# Patient Record
Sex: FEMALE | Race: WHITE
[De-identification: names, ages, dates, MRNs, and addresses within clinical notes are randomized per-mention and may not be internally consistent; named-entity substitution may affect disease eponyms.]

---

## 2018-06-26 ENCOUNTER — HOSPITAL ENCOUNTER (EMERGENCY)
Dept: HOSPITAL 62 - ER | Age: 18
LOS: 1 days | Discharge: HOME | End: 2018-06-27
Payer: OTHER GOVERNMENT

## 2018-06-26 DIAGNOSIS — R10.10: Primary | ICD-10-CM

## 2018-06-26 DIAGNOSIS — E86.0: ICD-10-CM

## 2018-06-26 LAB
ADD MANUAL DIFF: NO
ALBUMIN SERPL-MCNC: 4.9 G/DL (ref 3.7–5.6)
ALP SERPL-CCNC: 66 U/L (ref 50–135)
ALT SERPL-CCNC: 22 U/L (ref 5–35)
ANION GAP SERPL CALC-SCNC: 20 MMOL/L (ref 5–19)
APPEARANCE UR: (no result)
APTT PPP: YELLOW S
AST SERPL-CCNC: 21 U/L (ref 5–30)
BASOPHILS # BLD AUTO: 0 10^3/UL (ref 0–0.2)
BASOPHILS NFR BLD AUTO: 0.4 % (ref 0–2)
BILIRUB DIRECT SERPL-MCNC: 0.5 MG/DL (ref 0–0.4)
BILIRUB SERPL-MCNC: 0.5 MG/DL (ref 0.2–1.3)
BILIRUB UR QL STRIP: NEGATIVE
BUN SERPL-MCNC: 8 MG/DL (ref 7–20)
CALCIUM: 10.1 MG/DL (ref 8.4–10.2)
CHLORIDE SERPL-SCNC: 105 MMOL/L (ref 98–107)
CO2 SERPL-SCNC: 17 MMOL/L (ref 22–30)
EOSINOPHIL # BLD AUTO: 0 10^3/UL (ref 0–0.6)
EOSINOPHIL NFR BLD AUTO: 0.1 % (ref 0–6)
ERYTHROCYTE [DISTWIDTH] IN BLOOD BY AUTOMATED COUNT: 11.9 % (ref 11.5–14)
GLUCOSE SERPL-MCNC: 62 MG/DL (ref 75–110)
GLUCOSE UR STRIP-MCNC: NEGATIVE MG/DL
HCT VFR BLD CALC: 45.4 % (ref 36–47)
HGB BLD-MCNC: 15.6 G/DL (ref 12–15.5)
KETONES UR STRIP-MCNC: 80 MG/DL
LIPASE SERPL-CCNC: 87.5 U/L (ref 23–300)
LYMPHOCYTES # BLD AUTO: 1.6 10^3/UL (ref 0.5–4.7)
LYMPHOCYTES NFR BLD AUTO: 13 % (ref 13–45)
MCH RBC QN AUTO: 31.1 PG (ref 27–33.4)
MCHC RBC AUTO-ENTMCNC: 34.4 G/DL (ref 32–36)
MCV RBC AUTO: 90 FL (ref 80–97)
MONOCYTES # BLD AUTO: 0.8 10^3/UL (ref 0.1–1.4)
MONOCYTES NFR BLD AUTO: 6.8 % (ref 3–13)
NEUTROPHILS # BLD AUTO: 9.8 10^3/UL (ref 1.7–8.2)
NEUTS SEG NFR BLD AUTO: 79.7 % (ref 42–78)
NITRITE UR QL STRIP: NEGATIVE
PH UR STRIP: 6 [PH] (ref 5–9)
PLATELET # BLD: 329 10^3/UL (ref 150–450)
POTASSIUM SERPL-SCNC: 4.2 MMOL/L (ref 3.6–5)
PROT SERPL-MCNC: 8.5 G/DL (ref 6.3–8.2)
PROT UR STRIP-MCNC: 30 MG/DL
RBC # BLD AUTO: 5.02 10^6/UL (ref 3.72–5.28)
SODIUM SERPL-SCNC: 141.9 MMOL/L (ref 137–145)
SP GR UR STRIP: 1.01
TOTAL CELLS COUNTED % (AUTO): 100 %
UROBILINOGEN UR-MCNC: NEGATIVE MG/DL (ref ?–2)
WBC # BLD AUTO: 12.3 10^3/UL (ref 4–10.5)

## 2018-06-26 PROCEDURE — 81025 URINE PREGNANCY TEST: CPT

## 2018-06-26 PROCEDURE — 96361 HYDRATE IV INFUSION ADD-ON: CPT

## 2018-06-26 PROCEDURE — 76705 ECHO EXAM OF ABDOMEN: CPT

## 2018-06-26 PROCEDURE — 96374 THER/PROPH/DIAG INJ IV PUSH: CPT

## 2018-06-26 PROCEDURE — 80053 COMPREHEN METABOLIC PANEL: CPT

## 2018-06-26 PROCEDURE — 85025 COMPLETE CBC W/AUTO DIFF WBC: CPT

## 2018-06-26 PROCEDURE — 36415 COLL VENOUS BLD VENIPUNCTURE: CPT

## 2018-06-26 PROCEDURE — 99284 EMERGENCY DEPT VISIT MOD MDM: CPT

## 2018-06-26 PROCEDURE — S0119 ONDANSETRON 4 MG: HCPCS

## 2018-06-26 PROCEDURE — 81001 URINALYSIS AUTO W/SCOPE: CPT

## 2018-06-26 PROCEDURE — 83690 ASSAY OF LIPASE: CPT

## 2018-06-26 NOTE — ER DOCUMENT REPORT
ED GI/





- General


Chief Complaint: Abdominal Pain


Stated Complaint: ABDOMINAL PAIN


Time Seen by Provider: 06/26/18 19:09


Notes: 


Patient is a 17-year-old female that comes to the emergency department for 

chief complaint of abdominal pain worse in her upper abdomen and symptoms worse 

with eating, she states that after she eats she will have diarrhea.  She states 

she was having a lot of diarrhea but this resolved, now she is only had 2 loose 

bowel movements today.  She denies vomiting.  No flank pain.  Maximum 

temperature 100.0 F. She was seen 2 days ago, had a urinalysis and was given a 

GI cocktail, states she was prescribed medications that does not seem to be 

helping.  She was given Pepcid and Reglan.  She states she is still drinking 

fluids and eating saltine crackers.  She denies any recent travel, recent 

antibiotics, raw food, or obvious sick contacts.  She takes amitriptyline and 

oral contraceptive, no surgeries, no other medical history reported.








TRAVEL OUTSIDE OF THE [place] IN LAST 30 DAYS: No





- Related Data


Allergies/Adverse Reactions: 


 





No Known Allergies Allergy (Verified 06/26/18 18:18)


 








Home Medications: Also take birth control pill daily





Past Medical History





- General


Information source: Patient





- Social History


Smoking Status: Never Smoker


Chew tobacco use (# tins/day): No


Frequency of alcohol use: None


Drug Abuse: None


Lives with: Spouse/Significant other


Family History: Reviewed & Not Pertinent


Patient has suicidal ideation: No


Patient has homicidal ideation: No





- Medical History


Medical History: Negative


Renal/ Medical History: Denies: Hx Peritoneal Dialysis


Surgical Hx: Negative





- Immunizations


Hx Diphtheria, Pertussis, Tetanus Vaccination: Yes





Review of Systems





- Review of Systems


Constitutional: No symptoms reported


EENT: No symptoms reported


Cardiovascular: No symptoms reported


Respiratory: No symptoms reported


Gastrointestinal: See HPI


Genitourinary: No symptoms reported


Female Genitourinary: No symptoms reported


Musculoskeletal: No symptoms reported


Skin: No symptoms reported


Hematologic/Lymphatic: No symptoms reported


Neurological/Psychological: No symptoms reported





Physical Exam





- Vital signs


Vitals: 


 











Temp Pulse Resp BP Pulse Ox


 


 99.0 F   100 H  13   122/77   98 


 


 06/26/18 18:19  06/26/18 18:19  06/26/18 18:19  06/26/18 18:19  06/26/18 18:19














- Notes


Notes: 





GENERAL: Alert, interacts well. No acute distress.


HEAD: Normocephalic, atraumatic.


EYES: Pupils equal, round, and reactive to light. Extraocular movements intact.


ENT: Oral mucosa dry, tongue midline. 


NECK: Full range of motion. Supple. Trachea midline.


LUNGS: Clear to auscultation bilaterally, no wheezes, rales, or rhonchi. No 

respiratory distress.


HEART: Regular rate and rhythm. No murmur


ABDOMEN: Generalized upper abdominal tenderness, nonspecific, no overt 

guarding. Non-distended. Bowel sounds present in all 4 quadrants.


EXTREMITIES: Moves all 4 extremities spontaneously. No edema, normal radial and 

dorsalis pedis pulses bilaterally. No cyanosis.


BACK: no cervical, thoracic, lumbar midline tenderness. No saddle anesthesia, 

normal distal neurovascular exam. 


NEUROLOGICAL: Alert and oriented x3. Normal speech. [cranial nerves II through 

XII grossly intact]. 


PSYCH: Normal affect, normal mood.


SKIN: Warm, dry, normal turgor. No rashes or lesions noted.








Course





- Re-evaluation


Re-evalutation: 


Mild leukocytosis, bicarbonate is low, ketones in the urine.  Consistent with 

dehydration.  Given IV fluids, nausea medicine, Toradol.  Patient was given 

juice because of hypoglycemia.





Right upper quadrant ultrasound is normal.  Abdomen shows generalized upper 

abdominal tenderness, nonspecific.  Based on her normal ultrasound but 

persistent or symptoms with eating I suspect patient has either gastritis or 

gallbladder dyskinesis.  This was discussed in detail with patient.  Medication 

plan discussed with patient as well, decision was made to provide her with 

Toradol because it worked so well here although precautions will be taken 

because of her possible gastritis.  Patient has excellent follow-up.  Patient 

states she understands return precautions.  Stable at time of discharge.





- Vital Signs


Vital signs: 


 











Temp Pulse Resp BP Pulse Ox


 


 98.9 F   100 H  20   112/64   99 


 


 06/26/18 22:38  06/26/18 18:19  06/26/18 22:38  06/26/18 22:38  06/26/18 22:38














- Laboratory


Result Diagrams: 


 06/26/18 19:25





 06/26/18 19:25


Laboratory results interpreted by me: 


 











  06/26/18 06/26/18 06/26/18





  19:25 19:25 19:25


 


WBC  12.3 H  


 


Hgb  15.6 H  


 


Seg Neutrophils %  79.7 H  


 


Absolute Neutrophils  9.8 H  


 


Carbon Dioxide   17 L 


 


Anion Gap   20 H 


 


Glucose   62 L 


 


Direct Bilirubin   0.5 H 


 


Total Protein   8.5 H 


 


Urine Protein    30 H


 


Urine Ketones    80 H














Discharge





- Discharge


Clinical Impression: 


 Upper abdominal pain, Dehydration





Condition: Stable


Disposition: HOME, SELF-CARE


Additional Instructions: 


Your symptoms and evaluation are most suggestive of either gallbladder 

dyskinesia or developing inflammation in your upper abdomen.


I recommend a HIDA scan, this can show gallbladder dyskinesis, this can lead to 

surgery.  If the HIDA scan is normal you most likely will need to see 

gastroenterology and likely get an endoscopy.





You can take the Toradol which would help prevent pain from spasm of the 

gallbladder with gallbladder dyskinesis, however do so carefully and only with 

food because if your problem is gastritis/inflammation of the abdomen it can 

become worse.  Take the Pepcid as prescribed previously, add the Carafate to 

this.  Avoid caffeine, NSAIDs, alcohol, smoking, spicy food.  Start with bland 

foods and slowly progress.  Continue to rehydrate.





Return if you worsen including uncontrolled vomiting, vomiting blood, severe 

abdominal pain, black stools, or any other concerning or worsening symptoms.


Prescriptions: 


Ketorolac Tromethamine [Toradol 10 mg Tablet] 10 mg PO Q8HP PRN #24 tablet


 PRN Reason: 


Sucralfate [Carafate 1 gm Tablet] 1 gm PO QID #20 tablet

## 2018-06-26 NOTE — RADIOLOGY REPORT (SQ)
EXAM DESCRIPTION:  U/S ABDOMEN LIMITED W/O DOP



COMPLETED DATE/TIME:  6/26/2018 9:55 pm



REASON FOR STUDY:  epigastric and RUQ pain



COMPARISON:  None.



TECHNIQUE:  Dynamic and static grayscale images acquired of the abdomen and recorded on PACS. Additio
nal selected color Doppler and spectral images recorded.



LIMITATIONS:  None.



FINDINGS:  PANCREAS: No masses.  Visualized pancreatic duct normal caliber.

LIVER: 13.4 cm.  Normal echotexture.  No masses.

LIVER VASCULATURE: Normal directional flow of the main portal vein and hepatic veins.

GALLBLADDER: No stones. Normal wall thickness. No pericholecystic fluid.

ULTRASOUND-DETECTED CARCAMO'S SIGN: Negative.

INTRAHEPATIC DUCTS AND COMMON DUCT: CBD and intrahepatic ducts normal caliber. No filling defects.

INFERIOR VENA CAVA: Normal flow.

AORTA: No aneurysm.

RIGHT KIDNEY:  Normal size, 11.4 cm. Normal echogenicity. No solid or suspicious masses. No hydroneph
rosis. No calcifications.

PERITONEAL AND RIGHT PLEURAL SPACE: No ascites or effusions.

OTHER: No other significant findings.



IMPRESSION:  NORMAL RIGHT UPPER QUADRANT ULTRASOUND.



TECHNICAL DOCUMENTATION:  JOB ID:  [id number]

 2011 [employer]- All Rights Reserved



Reading location - IP/workstation name: [email or web address]

## 2018-06-26 NOTE — ER DOCUMENT REPORT
ED Medical Screen (RME)





- General


Chief Complaint: Abdominal Pain


Stated Complaint: ABDOMINAL PAIN


Time Seen by Provider: 06/26/18 19:09


Notes: 





RAPID MEDICAL EVALUATION DISCLOSURE


I have seen this patient as part of a Rapid Medical Evaluation and, if 

applicable, placed any initially appropriate orders. The patient will be seen 

and fully evaluated, including a full history and physical exam, by a provider (

in Main ED or Fast Track) when a room becomes available.





------------------------------------------------------------------





17-year-old female here with complaints of ongoing abdominal pain diarrhea 

lightheadedness for the past 1 week.  She was seen in the emergency department 

several days ago and had a negative urinalysis.  She reports receiving a GI 

cocktail but not having much improvement after that.  Her lightheadedness is 

worse after going from a sitting to standing position.  She has been drinking 

plenty of fluids, she reports.





EXAM


Minimal lower quadrant TTP


Mild epigastric and left upper quadrant TTP








TRAVEL OUTSIDE OF THE [place] IN LAST 30 DAYS: No





- Related Data


Allergies/Adverse Reactions: 


 





No Known Allergies Allergy (Verified 06/26/18 18:18)


 








Home Medications: Also take birth control pill daily





Past Medical History





- Social History


Chew tobacco use (# tins/day): No


Frequency of alcohol use: None


Drug Abuse: None


Renal/ Medical History: Denies: Hx Peritoneal Dialysis





Physical Exam





- Vital signs


Vitals: 





 











Temp Pulse Resp BP Pulse Ox


 


 99.0 F   100 H  13   122/77   98 


 


 06/26/18 18:19  06/26/18 18:19  06/26/18 18:19  06/26/18 18:19  06/26/18 18:19














Course





- Vital Signs


Vital signs: 





 











Temp Pulse Resp BP Pulse Ox


 


 99.0 F   100 H  13   122/77   98 


 


 06/26/18 18:19  06/26/18 18:19  06/26/18 18:19  06/26/18 18:19  06/26/18 18:19

## 2018-06-27 VITALS — SYSTOLIC BLOOD PRESSURE: 112 MMHG | DIASTOLIC BLOOD PRESSURE: 64 MMHG
